# Patient Record
Sex: FEMALE | URBAN - METROPOLITAN AREA
[De-identification: names, ages, dates, MRNs, and addresses within clinical notes are randomized per-mention and may not be internally consistent; named-entity substitution may affect disease eponyms.]

---

## 2022-06-07 ENCOUNTER — OFFICE VISIT (OUTPATIENT)
Dept: URBAN - METROPOLITAN AREA CLINIC 22 | Facility: CLINIC | Age: 52
End: 2022-06-07
Payer: COMMERCIAL

## 2022-06-07 DIAGNOSIS — H52.4 PRESBYOPIA: Primary | ICD-10-CM

## 2022-06-07 PROCEDURE — 92004 COMPRE OPH EXAM NEW PT 1/>: CPT | Performed by: STUDENT IN AN ORGANIZED HEALTH CARE EDUCATION/TRAINING PROGRAM

## 2022-06-07 ASSESSMENT — INTRAOCULAR PRESSURE
OS: 17
OD: 18

## 2022-06-07 ASSESSMENT — VISUAL ACUITY
OD: 20/20
OS: 20/20

## 2022-06-07 NOTE — IMPRESSION/PLAN
Impression: Presbyopia: H52.4. Plan: Discussed findings. New Rx finalized and given to patient. Discussed DFE; pt reports she has never had one. Would like to return w/ .